# Patient Record
Sex: FEMALE | Race: AMERICAN INDIAN OR ALASKA NATIVE
[De-identification: names, ages, dates, MRNs, and addresses within clinical notes are randomized per-mention and may not be internally consistent; named-entity substitution may affect disease eponyms.]

---

## 2019-08-07 ENCOUNTER — HOSPITAL ENCOUNTER (EMERGENCY)
Dept: HOSPITAL 5 - ED | Age: 69
LOS: 1 days | Discharge: HOME | End: 2019-08-08
Payer: MEDICARE

## 2019-08-07 DIAGNOSIS — I10: ICD-10-CM
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
## 2019-08-07 DIAGNOSIS — Z98.890: ICD-10-CM
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
## 2019-08-07 DIAGNOSIS — S82.61XA: Primary | ICD-10-CM

## 2019-08-07 DIAGNOSIS — E11.9: ICD-10-CM

## 2019-08-07 DIAGNOSIS — S82.891A: ICD-10-CM
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
## 2019-08-07 NOTE — XRAY REPORT
Right knee 3 views.



INDICATION / CLINICAL INFORMATION:

pain and swelling r/t fall



COMPARISON:

None available.

 

FINDINGS:



BONES / JOINT(S): No acute fracture or subluxation. Minimal DJD.

SOFT TISSUES: No significant abnormality.



ADDITIONAL FINDINGS: Vascular calcification is noted.







Signer Name: Du Daniels MD 

Signed: 8/7/2019 10:35 PM

 Workstation Name: [id number]

## 2019-08-07 NOTE — XRAY REPORT
RIGHT ANKLE 4 VIEWS.



INDICATION / CLINICAL INFORMATION:

pain/swelling r/t fall



COMPARISON:

None available.

 

FINDINGS:



BONES / JOINT(S): Acute spiral fracture of the lateral malleolus. Avulsion at the medial malleolus ap
pears chronic. Mild degenerative changes noted at the midfoot.

SOFT TISSUES: Lateral soft tissue swelling



ADDITIONAL FINDINGS: None.







Signer Name: Du Daniels MD 

Signed: 8/7/2019 10:33 PM

 Workstation Name: [id number]

## 2019-08-08 VITALS — SYSTOLIC BLOOD PRESSURE: 154 MMHG | DIASTOLIC BLOOD PRESSURE: 68 MMHG

## 2019-08-08 NOTE — EMERGENCY DEPARTMENT REPORT
ED Fall HPI





- General


Chief Complaint: Fall


Stated Complaint: FALL


Time Seen by Provider: 19 00:15


Source: patient


Mode of arrival: Wheelchair





- History of Present Illness


Initial Comments: 





Patient is a 68-year-old -American female with a history of hypertension 

and non-insulin-dependent diabetes who presents to the ED with complaint of 

acute onset persistent severe right ankle and knee pain after she slipped and 

fell down on the floor at home 6 hours ago.  Patient states that she is unable 

to bear weight on right ankle pain.  Patient denies loss of consciousness, 

syncope, seizures, head or neck injury, back injuries, hip pain, dizziness, 

nausea, vomiting, abdominal pain, chest pain, hematuria, change in vision, 

numbness and tingling or weakness of lower and upper extremities bilaterally.


MD Complaint: fall, other (right ankle and knee pain)


-: Sudden, hour(s) (6)


Fall From: standing, other (slipped and fell down on floor)


When Fall Occurred: 4-6 hours PTA


Fall Witnessed: yes, by family


Place Fall Occurred: home


Loss of Consciousness: none


Prolonged Down Time?: no


Symptoms Prior to Fall: none


Location: other (right ankle and knee)


Location - Extremities: Right: Knee (pain), Ankle (pain, swelling), Foot (pain)


Severity: severe


Severity scale (0 -10): 8


Quality: sharp, aching


Context: tripped/slipped


Associated Symptoms: denies, unable to walk.  denies: headache, neck pain, 

numbness, weakness, chest paint, shortness of breath, abdominal pain, hematuria,

lightheaded, vertigo, confusion





- Related Data


                                  Previous Rx's











 Medication  Instructions  Recorded  Last Taken  Type


 


Ibuprofen [Motrin] 600 mg PO Q8H PRN #24 tablet 19 Unknown Rx


 


Ondansetron [Zofran Odt] 4 mg PO Q6HR PRN #20 tab.rapdis 19 Unknown Rx


 


Oxycodone HCl/Acetaminophen 1 each PO Q6HR PRN #12 tablet 19 Unknown Rx





[Percocet 7.5/325 mg]    











                                    Allergies











Allergy/AdvReac Type Severity Reaction Status Date / Time


 


No Known Allergies Allergy   Unverified 19 21:45














ED Review of Systems


ROS: 


Stated complaint: FALL


Other details as noted in HPI





Comment: All other systems reviewed and negative


Constitutional: denies: chills, fever


Eyes: denies: eye pain, eye discharge, vision change


ENT: denies: ear pain, throat pain


Respiratory: denies: cough, shortness of breath, wheezing


Cardiovascular: denies: chest pain, palpitations


Endocrine: no symptoms reported


Gastrointestinal: denies: abdominal pain, nausea, diarrhea


Genitourinary: denies: urgency, dysuria, discharge


Musculoskeletal: joint swelling (right ankle), arthralgia (right knee, right 

ankle), myalgia.  denies: back pain


Skin: denies: rash, lesions


Neurological: denies: headache, weakness, paresthesias


Psychiatric: denies: anxiety, depression


Hematological/Lymphatic: denies: easy bleeding, easy bruising





ED Past Medical Hx





- Past Medical History


Previous Medical History?: Yes


Hx Hypertension: Yes


Hx Diabetes: Yes


Additional medical history: hyperkalemia





- Surgical History


Past Surgical History?: Yes


Additional Surgical History: neck surgery





- Social History


Smoking Status: Never Smoker


Substance Use Type: None





- Medications


Home Medications: 


                                Home Medications











 Medication  Instructions  Recorded  Confirmed  Last Taken  Type


 


Ibuprofen [Motrin] 600 mg PO Q8H PRN #24 tablet 19  Unknown Rx


 


Ondansetron [Zofran Odt] 4 mg PO Q6HR PRN #20 tab.rapdis 19  Unknown Rx


 


Oxycodone HCl/Acetaminophen 1 each PO Q6HR PRN #12 tablet 19  Unknown Rx





[Percocet 7.5/325 mg]     














ED Physical Exam





- General


Limitations: Physical Limitation


General appearance: alert, in no apparent distress





- Head


Head exam: Present: atraumatic, normocephalic, normal inspection





- Eye


Eye exam: Present: normal appearance, PERRL, EOMI


Pupils: Present: normal accommodation





- ENT


ENT exam: Present: normal exam, normal orophraynx, mucous membranes moist, TM's 

normal bilaterally, normal external ear exam





- Neck


Neck exam: Present: normal inspection, full ROM.  Absent: tenderness, 

meningismus, lymphadenopathy, thyromegaly





- Respiratory


Respiratory exam: Present: normal lung sounds bilaterally.  Absent: respiratory 

distress, wheezes, rales, chest wall tenderness, accessory muscle use, decreased

breath sounds, prolonged expiratory





- Cardiovascular


Cardiovascular Exam: Present: regular rate, normal rhythm, normal heart sounds. 

Absent: systolic murmur, diastolic murmur, rubs, gallop





- GI/Abdominal


GI/Abdominal exam: Present: soft, normal bowel sounds.  Absent: distended, 

tenderness, guarding, rebound, hyperactive bowel sounds, hypoactive bowel sounds





- Rectal


Rectal exam: Present: deferred





- Extremities Exam


Extremities exam: Present: normal inspection, tenderness (right ankle and knee),

normal capillary refill, joint swelling (right ankle).  Absent: full ROM 

(limited right ankle ROM due to pain), calf tenderness





- Back Exam


Back exam: Present: normal inspection, full ROM.  Absent: tenderness, CVA 

tenderness (R), CVA tenderness (L), muscle spasm, paraspinal tenderness, 

vertebral tenderness





- Neurological Exam


Neurological exam: Present: alert, oriented X3, CN II-XII intact, normal gait, 

reflexes normal





- Psychiatric


Psychiatric exam: Present: normal affect, normal mood





- Skin


Skin exam: Present: warm, dry, intact, normal color.  Absent: rash





ED Course





                                   Vital Signs











  19





  21:43


 


Temperature 98.3 F


 


Pulse Rate 98 H


 


Respiratory 16





Rate 


 


Blood Pressure 158/69


 


O2 Sat by Pulse 98





Oximetry 














- Reevaluation(s)


Reevaluation #1: 





19 00:57


This is a 68-year-old  female who presented to the ED with 

severe right ankle and knee pain after she slipped and fell down at home 6 hours

ago.  Right knee x-ray shows no acute fractures or subluxations but chronic 

degenerative joint disease.  The right ankle x-ray shows acute spiral fracture 

of the right lateral malleolus. Avulsion at the medial malleolus appears 

chronic. Mild degenerative changes noted at the midfoot.  There is also lateral 

soft tissue swelling.  Patient was treated for pain in the ED on the right ankle

was splinted with a posterior long leg sugartong splint.  Patient was discharged

home on pain medications and referred to the orthopedic surgeon Dr. Mark for 

follow-up.  The family was advised to contact the Dr. Mark's office first 

thing in the morning to schedule an appointment for follow-up.  Patient was 

advised to return to the ED immediately if symptoms get worse.











ED Medical Decision Making





- Radiology Data


Radiology results: report reviewed, image reviewed





Findings


[facility] 


[address] [place] [address] 





XRay Report 


Signed 





Patient: LEE ANN JOSHI MR#: [id number] 


25 


: 1950 Acct:[id number] 





Age/Sex: 68 / F ADM Date: 19 





Loc: ED 


Attending Dr: 








Ordering Physician: LISA WANG MD 


Date of Service: 19 


Procedure(s): XR ankle 3+V RT 


Accession Number(s): [id number] 





cc: LISA WANG MD 





Fluoro Time In Minutes: 





RIGHT ANKLE 4 VIEWS. 





INDICATION / CLINICAL INFORMATION: 


pain/swelling r/t fall 





COMPARISON: 


None available. 





FINDINGS: 





BONES / JOINT(S): Acute spiral fracture of the lateral malleolus. Avulsion at 

the medial malleolus 


appears chronic. Mild degenerative changes noted at the midfoot. 


SOFT TISSUES: Lateral soft tissue swelling 





ADDITIONAL FINDINGS: None. 











Signer Name: Du Daniels MD 


Signed: 2019 10:33 PM 


Workstation Name: [id number] 








Transcribed By: ES 


Dictated By: Du Daniels MD 


Electronically Authenticated By: Du Daniels MD 


Signed Date/Time: 19 2233 





 

--------------------------------------------------------------------------------


-----------------------------------------














Findings


[facility] 


[address] [place] [address] 





XRay Report 


Signed 





Patient: LEE ANN JOSHI MR#: [id number] 


27 


: 1950 Acct:[id number] 





Age/Sex: 68 / F ADM Date: 19 





Loc: ED 


Attending Dr: 








Ordering Physician: LISA WANG MD 


Date of Service: 19 


Procedure(s): XR knee 3V RT 


Accession Number(s): [id number] 





cc: LISA WANG MD 





Fluoro Time In Minutes: 





Right knee 3 views. 





INDICATION / CLINICAL INFORMATION: 


pain and swelling r/t fall 





COMPARISON: 


None available. 





FINDINGS: 





BONES / JOINT(S): No acute fracture or subluxation. Minimal DJD. 


SOFT TISSUES: No significant abnormality. 





ADDITIONAL FINDINGS: Vascular calcification is noted. 











Signer Name: Du Daniels MD 


Signed: 2019 10:35 PM 


Workstation Name: [id number] 








Transcribed By: ES 


Dictated By: Du Daniels MD 


Electronically Authenticated By: Du Daniels MD 


Signed Date/Time: 19 0906 




















- Medical Decision Making








This is a 68-year-old  female who presented to the ED with 

severe right ankle and knee pain after she slipped and fell down at home 6 hours

ago.  Right knee x-ray shows no acute fractures or subluxations but chronic 

degenerative joint disease.  The right ankle x-ray shows acute spiral fracture 

of the right lateral malleolus. Avulsion at the medial malleolus appears 

chronic. Mild degenerative changes noted at the midfoot.  There is also lateral 

soft tissue swelling.  Patient was treated for pain in the ED on the right ankle

was splinted with a posterior long leg sugartong splint.  Patient was discharged

home on pain medications and referred to the orthopedic surgeon Dr. Mark for 

follow-up.  The family was advised to contact the Dr. Mark's office first 

thing in the morning to schedule an appointment for follow-up.  Patient was 

advised to return to the ED immediately if symptoms get worse.








- Differential Diagnosis


right foot fracture; Right ankle fracture; Right knee contusion; contusion


Critical care attestation.: 


If time is entered above; I have spent that time in minutes in the direct care 

of this critically ill patient, excluding procedure time.








ED Disposition


Clinical Impression: 


 Contusion of right knee, initial encounter





Displaced fracture of lateral malleolus of right fibula


Qualifiers:


 Encounter type: initial encounter Fracture type: closed Qualified Code(s): 

S82.61XA - Displaced fracture of lateral malleolus of right fibula, initial 

encounter for closed fracture





Closed right ankle fracture


Qualifiers:


 Encounter type: initial encounter Qualified Code(s): S82.891A - Other fracture 

of right lower leg, initial encounter for closed fracture





Disposition: DC-01 TO HOME OR SELFCARE


Is pt being admited?: No


Does the pt Need Aspirin: No


Condition: Stable


Instructions:  Ankle Fracture (ED), Knee Pain (ED), Contusion in Adults (ED)


Additional Instructions: 


Take medications, drink plenty of fluids and follow-up with Dr. Mark, the 

orthopedic surgeon for follow-up.  Contact Dr. Mark's office first thing in 

the morning to schedule an appointment.  Return to the ED immediately if 

symptoms get worse.


Prescriptions: 


Ibuprofen [Motrin] 600 mg PO Q8H PRN #24 tablet


 PRN Reason: Pain


Oxycodone HCl/Acetaminophen [Percocet 7.5/325 mg] 1 each PO Q6HR PRN #12 tablet


 PRN Reason: Pain


Ondansetron [Zofran Odt] 4 mg PO Q6HR PRN #20 tab.rapdis


 PRN Reason: Nausea


Referrals: 


SHANTI MARK MD [Staff Physician] - 3-5 Days


Time of Disposition: 01:05


Print Language: ENGLISH

## 2025-06-17 ENCOUNTER — OFFICE VISIT (OUTPATIENT)
Dept: URBAN - METROPOLITAN AREA CLINIC 109 | Facility: CLINIC | Age: 75
End: 2025-06-17

## 2025-07-15 ENCOUNTER — LAB OUTSIDE AN ENCOUNTER (OUTPATIENT)
Dept: URBAN - METROPOLITAN AREA CLINIC 109 | Facility: CLINIC | Age: 75
End: 2025-07-15

## 2025-07-15 ENCOUNTER — DASHBOARD ENCOUNTERS (OUTPATIENT)
Age: 75
End: 2025-07-15

## 2025-07-15 ENCOUNTER — OFFICE VISIT (OUTPATIENT)
Dept: URBAN - METROPOLITAN AREA CLINIC 109 | Facility: CLINIC | Age: 75
End: 2025-07-15
Payer: COMMERCIAL

## 2025-07-15 DIAGNOSIS — R19.4 CHANGE IN BOWEL HABITS: ICD-10-CM

## 2025-07-15 DIAGNOSIS — Z12.11 SCREEN FOR COLON CANCER: ICD-10-CM

## 2025-07-15 PROCEDURE — 99204 OFFICE O/P NEW MOD 45 MIN: CPT | Performed by: INTERNAL MEDICINE

## 2025-07-15 NOTE — HPI-TODAY'S VISIT:
pt presents for colon cancer screening. pt reports occasional constipation based on diet, denies rectal bleeding, abdominal pain or other LGI symptoms. No recent weight loss or anemia. No UGI symptoms including nausea, vomiting, gerd or dysphagia. pt due for colonoscopy. pt reports h/o colon polyps in the past.

## 2025-07-15 NOTE — PHYSICAL EXAM EYES:
conjunctivae and eyelids appear normal, sclerae white without injection Rifampin Pregnancy And Lactation Text: This medication is Pregnancy Category C and it isn't know if it is safe during pregnancy. It is also excreted in breast milk and should not be used if you are breast feeding.

## 2025-08-19 ENCOUNTER — OFFICE VISIT (OUTPATIENT)
Dept: URBAN - METROPOLITAN AREA SURGERY CENTER 23 | Facility: SURGERY CENTER | Age: 75
End: 2025-08-19